# Patient Record
Sex: MALE | Race: WHITE | Employment: FULL TIME | URBAN - METROPOLITAN AREA
[De-identification: names, ages, dates, MRNs, and addresses within clinical notes are randomized per-mention and may not be internally consistent; named-entity substitution may affect disease eponyms.]

---

## 2017-02-07 ENCOUNTER — OFFICE VISIT (OUTPATIENT)
Dept: FAMILY MEDICINE | Facility: CLINIC | Age: 67
End: 2017-02-07
Payer: COMMERCIAL

## 2017-02-07 VITALS
BODY MASS INDEX: 26.09 KG/M2 | TEMPERATURE: 97 F | DIASTOLIC BLOOD PRESSURE: 70 MMHG | SYSTOLIC BLOOD PRESSURE: 122 MMHG | HEART RATE: 88 BPM | WEIGHT: 192.6 LBS | HEIGHT: 72 IN

## 2017-02-07 DIAGNOSIS — Z23 NEED FOR VACCINATION: ICD-10-CM

## 2017-02-07 DIAGNOSIS — Z23 NEED FOR PROPHYLACTIC VACCINATION AGAINST STREPTOCOCCUS PNEUMONIAE (PNEUMOCOCCUS): ICD-10-CM

## 2017-02-07 DIAGNOSIS — E11.42 DIABETIC POLYNEUROPATHY ASSOCIATED WITH TYPE 2 DIABETES MELLITUS (H): ICD-10-CM

## 2017-02-07 DIAGNOSIS — N18.30 CKD (CHRONIC KIDNEY DISEASE) STAGE 3, GFR 30-59 ML/MIN (H): ICD-10-CM

## 2017-02-07 DIAGNOSIS — N52.9 ERECTILE DYSFUNCTION, UNSPECIFIED ERECTILE DYSFUNCTION TYPE: Primary | ICD-10-CM

## 2017-02-07 PROCEDURE — 90670 PCV13 VACCINE IM: CPT | Performed by: FAMILY MEDICINE

## 2017-02-07 PROCEDURE — G0009 ADMIN PNEUMOCOCCAL VACCINE: HCPCS | Performed by: FAMILY MEDICINE

## 2017-02-07 PROCEDURE — 99214 OFFICE O/P EST MOD 30 MIN: CPT | Mod: 25 | Performed by: FAMILY MEDICINE

## 2017-02-07 RX ORDER — TADALAFIL 10 MG/1
5-10 TABLET ORAL DAILY PRN
Qty: 6 TABLET | Refills: 11 | Status: SHIPPED | OUTPATIENT
Start: 2017-02-07

## 2017-02-07 NOTE — NURSING NOTE
"Chief Complaint   Patient presents with     Prostate Problem       Initial /70 mmHg  Pulse 88  Temp(Src) 97  F (36.1  C) (Tympanic)  Ht 5' 11.5\" (1.816 m)  Wt 192 lb 9.6 oz (87.363 kg)  BMI 26.49 kg/m2 Estimated body mass index is 26.49 kg/(m^2) as calculated from the following:    Height as of this encounter: 5' 11.5\" (1.816 m).    Weight as of this encounter: 192 lb 9.6 oz (87.363 kg).  Medication Reconciliation: complete  "

## 2017-02-07 NOTE — PROGRESS NOTES
SUBJECTIVE:                                                    Justin Garnett is a 66 year old male who presents to clinic today for the following health issues:      Concern - ED      Onset: Years         Therapies Tried and outcome: nothing      Problem list and histories reviewed & adjusted, as indicated.  Additional history: as documented    Patient Active Problem List   Diagnosis     Erectile dysfunction     Numbness     Type 2 diabetes, HbA1c goal < 7% (H)     Anemia     Non-Hodgkin's lymphoma (H)     Advanced directives, counseling/discussion     Varicose veins of legs     Colon polyp     WBC decreased     Cholelithiasis     Peripheral edema     Hypertension goal BP (blood pressure) < 140/90     Nephrotic syndrome in diseases classified elsewhere     Diabetic renal disease (H)     Diabetic neuropathy (H)     CKD (chronic kidney disease) stage 3, GFR 30-59 ml/min     Hyperlipidemia LDL goal <100     Diabetic retinopathy (H)     Lymphoma in remission     Hyperkalemia     Right shoulder pain     Past Surgical History   Procedure Laterality Date     Tonsillectomy & adenoidectomy  1957     Hc colonoscopy thru stoma, diagnostic  6/2010     Bone marrow biopsy, bone specimen, needle/trocar  3/1/2012     Procedure:BIOPSY BONE MARROW; BONE MARROW BIOPSY WITH ASPIRATION ; Surgeon:JACKI BAUMAN; Location: GI     Genitourinary surgery       drainage of kidney abcess     Lengthen tendon achilles  10/17/2012     Procedure: LENGTHEN TENDON ACHILLES;  RIGHT ACHILLES LENGHTENING WITH EXTENSOR TENDON LENGHTENING, TENOTOMIES TOES TWO THROUGH FIVE RIGHT FOOT, POSSIBLE ULCER EXCISION WITH FLAP CLOSURE  (MINI C-ARM, LEG FOAM, PODIATRY SET) ;  Surgeon: Eleni Diamond DPM;  Location: [facility]     Release tendon toe  10/17/2012     Procedure: RELEASE TENDON TOE;  Right Great Toe Tendon Lengthening.  Tenotomies of toes two through five.;  Surgeon: Eleni Diamond DPM;  Location: [facility]     Release plantar fascia  10/17/2012      Procedure: RELEASE PLANTAR FASCIA;;  Surgeon: Eleni Diamond DPM;  Location:  [facility]     Incision and drainage bone lower extremity, combined  3/2/2013     Procedure: COMBINED INCISION AND DRAINAGE BONE LOWER EXTREMITY;  RIGHT FOOT ULCER EXCISION AND DEBRIDEMENT WITH  FLAP CLOSURE,RIGHT GREAT TOE AMPUTATION ;  Surgeon: Eleni Diamond DPM;  Location:  OR     Amputate toe(s)  3/2/2013     Procedure: AMPUTATE TOE(S);;  Surgeon: Eleni Diamond DPM;  Location:  OR     Colonoscopy N/A 2015     Procedure: COLONOSCOPY;  Surgeon: Stephane Pugh MD;  Location:  [facility]       Social History   Substance Use Topics     Smoking status: Former Smoker -- 2.00 packs/day for 40 years     Types: Cigarettes     Quit date: 2008     Smokeless tobacco: Never Used     Alcohol Use: 0.0 oz/week     0 Standard drinks or equivalent per week      Comment: 0-1 drink a week     Family History   Problem Relation Age of Onset     Arthritis Mother      C.A.D. Father      MI,  53     DIABETES Maternal Grandmother      Breast Cancer Mother          Current Outpatient Prescriptions   Medication Sig Dispense Refill     tadalafil (CIALIS) 10 MG tablet Take 0.5-1 tablets (5-10 mg) by mouth daily as needed for erectile dysfunction Never use with nitroglycerin, terazosin or doxazosin. 6 tablet 11     amoxicillin-clavulanate (AUGMENTIN) 500-125 MG per tablet Take 1 tablet by mouth 2 times daily 20 tablet 0     hydrochlorothiazide (HYDRODIURIL) 25 MG tablet Take 1 tablet (25 mg) by mouth daily 90 tablet 3     COMPRESSION STOCKINGS 1 each daily 1 each 1     GABAPENTIN PO Take 300 mg by mouth 2-3 times daily       ACCU-CHEK LENA PLUS test strip   2     LANTUS SOLOSTAR 100 UNIT/ML soln   1     BD MARIYA U/F 32G X 4 MM insulin pen needle   2     blood glucose monitoring (SOFTCLIX) lancets   2     lisinopril (PRINIVIL,ZESTRIL) 20 MG tablet Take 1 tablet (20 mg) by mouth 2 times daily 180 tablet 1     LANTUS SOLOSTAR SOLN 100 UNIT/ML 12 units  "daily       simvastatin (ZOCOR) 10 MG tablet Take 1 tablet (10 mg) by mouth At Bedtime 90 tablet 1     Multiple Vitamins-Iron (MULTIVITAMIN  /IRON) TABS Take 1 tablet by mouth daily. 100 tablet prn     aspirin 325 MG EC tablet Take 1 tablet by mouth daily. 100 tablet 3     Allergies   Allergen Reactions     No Known Allergies      Recent Labs   Lab Test 07/06/16 06/29/16 05/09/16   0921 12/18/15 06/26/15  12/16/14   0820   03/05/14   0742   11/30/11   0730   A1C   --    --   5.9   --   6.1*  6.0   --   6.3*   < >  7.5*   LDL   --    --   64   --    --   50   --   69   < >  102   HDL   --    --   38*   --    --   56   --   37*   < >  39*   TRIG   --    --   184*   --    --   155*   --   170*   < >  96   ALT   --   18  37  20   --    --    --   47   < >  11   CR  1.6  1.53  1.49*  1.56   --   1.47*   --   1.50*   < >  1.30*   GFRESTIMATED   --   55.3  47*  45.0   --   48*   --   47*   < >  56*   GFRESTBLACK   --   45.6  57*  54.5   --   58*   --   57*   < >  68   POTASSIUM   --   4.9  5.1  4.9   --   4.9   < >  5.5*   < >  5.0   TSH   --    --    --    --   0.48   --    --    --    --   0.78    < > = values in this interval not displayed.      BP Readings from Last 3 Encounters:   02/07/17 122/70   12/05/16 128/76   11/28/16 122/70    Wt Readings from Last 3 Encounters:   02/07/17 192 lb 9.6 oz (87.363 kg)   12/05/16 194 lb (87.998 kg)   11/28/16 194 lb (87.998 kg)                    ROS:  Constitutional, HEENT, cardiovascular, pulmonary, gi and gu systems are negative, except as otherwise noted.    OBJECTIVE:                                                    /70 mmHg  Pulse 88  Temp(Src) 97  F (36.1  C) (Tympanic)  Ht 5' 11.5\" (1.816 m)  Wt 192 lb 9.6 oz (87.363 kg)  BMI 26.49 kg/m2  Body mass index is 26.49 kg/(m^2).  GENERAL: healthy, alert and no distress  NECK: no adenopathy, no asymmetry, masses, or scars and thyroid normal to palpation  RESP: lungs clear to auscultation - no rales, rhonchi or " wheezes  CV: regular rate and rhythm, normal S1 S2, no S3 or S4, no murmur, click or rub, no peripheral edema and peripheral pulses strong  ABDOMEN: soft, nontender, no hepatosplenomegaly, no masses and bowel sounds normal  MS: no gross musculoskeletal defects noted, no edema         ASSESSMENT/PLAN:                                                    ASSESSMENT / PLAN:  (N52.9) Erectile dysfunction, unspecified erectile dysfunction type  (primary encounter diagnosis)  Comment: has been having frequent issue with ED, related with worsening DM neuropathy, will have him to try Cialis    Plan: tadalafil (CIALIS) 10 MG tablet            (Z23) Need for prophylactic vaccination against Streptococcus pneumoniae (pneumococcus)      (N18.3) CKD (chronic kidney disease) stage 3, GFR 30-59 ml/min  Comment: stable, has no worsening clinical finding   Plan: will keep watchings x     (E11.42) Diabetic polyneuropathy associated with type 2 diabetes mellitus (H)  Comment: mentioned above   Plan: tadalafil (CIALIS) 10 MG tablet            Abhishek Calvin MD  [facility] [place]

## 2017-04-05 ENCOUNTER — TRANSFERRED RECORDS (OUTPATIENT)
Dept: HEALTH INFORMATION MANAGEMENT | Facility: CLINIC | Age: 67
End: 2017-04-05

## 2017-04-05 LAB
ALT SERPL-CCNC: 22 U/L (ref 9–46)
CHOLEST SERPL-MCNC: 133 MG/DL (ref 125–200)
CREAT SERPL-MCNC: 1.51 MG/DL (ref 0.7–1.25)
GFR SERPL CREATININE-BSD FRML MDRD: 47 ML/MIN/1.73M2
GLUCOSE SERPL-MCNC: 108 MG/DL (ref 65–99)
HBA1C MFR BLD: 6 % (ref 4–6)
HDLC SERPL-MCNC: 44 MG/DL
LDLC SERPL CALC-MCNC: 69 MG/DL
NONHDLC SERPL-MCNC: 89 MG/DL
POTASSIUM SERPL-SCNC: 5.5 MMOL/L (ref 3.5–5.3)
TRIGL SERPL-MCNC: 102 MG/DL

## 2017-04-06 ENCOUNTER — TRANSFERRED RECORDS (OUTPATIENT)
Dept: HEALTH INFORMATION MANAGEMENT | Facility: CLINIC | Age: 67
End: 2017-04-06

## 2017-07-01 DIAGNOSIS — I10 ESSENTIAL HYPERTENSION WITH GOAL BLOOD PRESSURE LESS THAN 140/90: ICD-10-CM

## 2017-07-01 NOTE — LETTER
[facility]   [address] [place] [address]   [phone]      July 6, 2017    Justin Garnett  [address] [place] [address]            Dear Mr. Garnett    Your physician requires an office visit in order to monitor your maintenance medication(s).  Your last office visit was on 2/7/17.  Please call the clinic at [phone] to schedule an appointment.        Sincerely,    [facility]

## 2017-07-03 RX ORDER — HYDROCHLOROTHIAZIDE 25 MG/1
TABLET ORAL
Qty: 30 TABLET | Refills: 0 | Status: SHIPPED | OUTPATIENT
Start: 2017-07-03

## 2017-07-03 NOTE — TELEPHONE ENCOUNTER
Agree with small Script refill faxed. Remind pt to do follow up for med check and labs, since he is due.

## 2017-07-03 NOTE — TELEPHONE ENCOUNTER
Routing refill request to provider for review/approval because:  Labs out of range:  See below    Eleanor Garcia RN  [facility]  [phone]

## 2017-07-03 NOTE — TELEPHONE ENCOUNTER
Routing to team to inform and assist in scheduling.   Senait Leon RN   [facility] - Triage

## 2017-07-03 NOTE — TELEPHONE ENCOUNTER
hydrochlorothiazide (HYDRODIURIL) 25 MG tablet      Last Written Prescription Date: 7/18/2016  Last Fill Quantity: 90, # refills: 3  Last Office Visit with [facility], [facility] or [facility] prescribing provider: 2/7/2017       Potassium   Date Value Ref Range Status   04/05/2017 5.5 (A) 3.5 - 5.3 mmol/L Final     Creatinine   Date Value Ref Range Status   04/05/2017 1.51 (A) 0.70 - 1.25 mg/dL Final     BP Readings from Last 3 Encounters:   02/07/17 122/70   12/05/16 128/76   11/28/16 122/70

## 2017-07-17 NOTE — TELEPHONE ENCOUNTER
Spoke with pt states he is out of the area and will not be returning.    Told pt he needs to establish care with a new provider before he is out of medication.    Jacquie Reyes CMA

## 2019-11-05 ENCOUNTER — HEALTH MAINTENANCE LETTER (OUTPATIENT)
Age: 69
End: 2019-11-05

## 2020-01-18 NOTE — TELEPHONE ENCOUNTER
Fall Prevention and Home Safety  Discharge Planning Group  ???????????????????????????????  Patient participated in discharge planning group focusing on fall prevention.  Patient was educated on definition of a fall, falls statistics and research, fall risk factors including intrinsic and extrinsic risk factors, home safety, and general hints pertaining to fall prevention.     The following handouts were provided during group:    Fall Prevention in the Home  Falls Efficacy Scale-International (completed during group)     Educated patient on improving safety aspects in each area of the home.  Patient participated in discussion and demonstrated understanding through his/her offering of problem-solving solutions on an individual basis and in general as a group.  Patient was open to writer providing new learning on ways to improve safety in the home for safe discharge planning.      Discussed adaptive equipment options, safety with self-cares and functional transfers, recognition and removal of home hazards, being attentive to environment/surroundings, importance of pacing and avoiding rushing, safety strategies when reaching for items from various surfaces, means of transporting items in the home safely, proper lighting indoors and outdoors, stairs management and general safety hints related to factors such as medications, vision, emergency alert systems and importance of exercise and engagement in regular physical activity.     The patient was educated on impact of aging with disability on fall risk and general safety. The patient was also educated on importance of maintaining a record of health information for easy reference.    Above information to be reinforced in further individual therapy sessions pertaining to patient's specific needs in order to maximize overall functional performance.  Information well received by patient. Patient benefited from peer discussion and support in the group setting.         Left message for pt to call back.    Jacquie Reyes CMA

## 2020-02-16 ENCOUNTER — HEALTH MAINTENANCE LETTER (OUTPATIENT)
Age: 70
End: 2020-02-16

## 2020-11-22 ENCOUNTER — HEALTH MAINTENANCE LETTER (OUTPATIENT)
Age: 70
End: 2020-11-22

## 2021-04-04 ENCOUNTER — HEALTH MAINTENANCE LETTER (OUTPATIENT)
Age: 71
End: 2021-04-04

## 2021-05-30 ENCOUNTER — HEALTH MAINTENANCE LETTER (OUTPATIENT)
Age: 71
End: 2021-05-30

## 2021-09-19 ENCOUNTER — HEALTH MAINTENANCE LETTER (OUTPATIENT)
Age: 71
End: 2021-09-19

## 2022-01-09 ENCOUNTER — HEALTH MAINTENANCE LETTER (OUTPATIENT)
Age: 72
End: 2022-01-09

## 2022-04-30 ENCOUNTER — HEALTH MAINTENANCE LETTER (OUTPATIENT)
Age: 72
End: 2022-04-30

## 2022-07-05 RX ORDER — CALCIUM ACETATE 667 MG/1
CAPSULE ORAL
COMMUNITY

## 2022-07-05 RX ORDER — GABAPENTIN 300 MG/1
1 CAPSULE ORAL
COMMUNITY

## 2022-07-05 RX ORDER — SIMVASTATIN 10 MG
TABLET ORAL
COMMUNITY

## 2022-07-05 RX ORDER — ALFUZOSIN HYDROCHLORIDE 10 MG/1
TABLET, EXTENDED RELEASE ORAL
COMMUNITY

## 2022-07-05 RX ORDER — LOSARTAN POTASSIUM 100 MG/1
TABLET ORAL
COMMUNITY

## 2022-07-05 RX ORDER — AMLODIPINE BESYLATE 10 MG/1
TABLET ORAL
COMMUNITY

## 2022-07-05 RX ORDER — FOLIC ACID/VIT B COMPLEX AND C 0.8 MG
TABLET ORAL
COMMUNITY

## 2022-07-05 RX ORDER — FUROSEMIDE 80 MG
TABLET ORAL
COMMUNITY

## 2022-07-05 RX ORDER — INSULIN DETEMIR 100 [IU]/ML
INJECTION, SOLUTION SUBCUTANEOUS
COMMUNITY
Start: 2019-08-26 | End: 2022-12-04

## 2022-07-05 RX ORDER — ASPIRIN 325 MG
TABLET ORAL
COMMUNITY

## 2022-08-20 ENCOUNTER — HEALTH MAINTENANCE LETTER (OUTPATIENT)
Age: 72
End: 2022-08-20

## 2022-11-20 ENCOUNTER — HEALTH MAINTENANCE LETTER (OUTPATIENT)
Age: 72
End: 2022-11-20

## 2023-04-15 ENCOUNTER — HEALTH MAINTENANCE LETTER (OUTPATIENT)
Age: 73
End: 2023-04-15

## 2023-06-02 ENCOUNTER — HEALTH MAINTENANCE LETTER (OUTPATIENT)
Age: 73
End: 2023-06-02

## 2023-11-25 ENCOUNTER — HEALTH MAINTENANCE LETTER (OUTPATIENT)
Age: 73
End: 2023-11-25